# Patient Record
Sex: FEMALE | URBAN - NONMETROPOLITAN AREA
[De-identification: names, ages, dates, MRNs, and addresses within clinical notes are randomized per-mention and may not be internally consistent; named-entity substitution may affect disease eponyms.]

---

## 2024-01-23 ENCOUNTER — APPOINTMENT (RX ONLY)
Dept: URBAN - NONMETROPOLITAN AREA CLINIC 41 | Facility: CLINIC | Age: 34
Setting detail: DERMATOLOGY
End: 2024-01-23

## 2024-01-23 VITALS — WEIGHT: 190 LBS | HEIGHT: 62 IN

## 2024-01-23 DIAGNOSIS — D22 MELANOCYTIC NEVI: ICD-10-CM

## 2024-01-23 DIAGNOSIS — L30.9 DERMATITIS, UNSPECIFIED: ICD-10-CM

## 2024-01-23 DIAGNOSIS — L57.8 OTHER SKIN CHANGES DUE TO CHRONIC EXPOSURE TO NONIONIZING RADIATION: ICD-10-CM

## 2024-01-23 PROBLEM — D22.5 MELANOCYTIC NEVI OF TRUNK: Status: ACTIVE | Noted: 2024-01-23

## 2024-01-23 PROCEDURE — 11104 PUNCH BX SKIN SINGLE LESION: CPT

## 2024-01-23 PROCEDURE — 96372 THER/PROPH/DIAG INJ SC/IM: CPT | Mod: 59

## 2024-01-23 PROCEDURE — ? COUNSELING

## 2024-01-23 PROCEDURE — 11105 PUNCH BX SKIN EA SEP/ADDL: CPT

## 2024-01-23 PROCEDURE — 99203 OFFICE O/P NEW LOW 30 MIN: CPT | Mod: 25

## 2024-01-23 PROCEDURE — ? INTRAMUSCULAR KENALOG

## 2024-01-23 PROCEDURE — ? DIAGNOSIS COMMENT

## 2024-01-23 PROCEDURE — ? BIOPSY BY PUNCH METHOD

## 2024-01-23 ASSESSMENT — BSA RASH: BSA RASH: 50

## 2024-01-23 ASSESSMENT — LOCATION DETAILED DESCRIPTION DERM
LOCATION DETAILED: LEFT KNEE
LOCATION DETAILED: RIGHT KNEE
LOCATION DETAILED: RIGHT POSTERIOR SHOULDER
LOCATION DETAILED: LEFT BUTTOCK
LOCATION DETAILED: LEFT SUPERIOR UPPER BACK
LOCATION DETAILED: RIGHT ANTERIOR PROXIMAL THIGH
LOCATION DETAILED: LEFT ANTERIOR PROXIMAL THIGH
LOCATION DETAILED: LEFT DISTAL PRETIBIAL REGION
LOCATION DETAILED: LEFT DISTAL POSTERIOR THIGH
LOCATION DETAILED: STERNAL NOTCH
LOCATION DETAILED: LEFT POSTERIOR SHOULDER
LOCATION DETAILED: RIGHT DISTAL POSTERIOR THIGH
LOCATION DETAILED: RIGHT BUTTOCK
LOCATION DETAILED: RIGHT DISTAL PRETIBIAL REGION
LOCATION DETAILED: LEFT DISTAL CALF
LOCATION DETAILED: RIGHT POSTERIOR ANKLE
LOCATION DETAILED: RIGHT INFERIOR UPPER BACK

## 2024-01-23 ASSESSMENT — LOCATION SIMPLE DESCRIPTION DERM
LOCATION SIMPLE: LEFT CALF
LOCATION SIMPLE: RIGHT PRETIBIAL REGION
LOCATION SIMPLE: RIGHT THIGH
LOCATION SIMPLE: LEFT KNEE
LOCATION SIMPLE: LEFT PRETIBIAL REGION
LOCATION SIMPLE: RIGHT SHOULDER
LOCATION SIMPLE: RIGHT POSTERIOR THIGH
LOCATION SIMPLE: LEFT BUTTOCK
LOCATION SIMPLE: RIGHT BUTTOCK
LOCATION SIMPLE: LEFT THIGH
LOCATION SIMPLE: CHEST
LOCATION SIMPLE: RIGHT ANKLE
LOCATION SIMPLE: RIGHT KNEE
LOCATION SIMPLE: LEFT POSTERIOR THIGH
LOCATION SIMPLE: LEFT UPPER BACK
LOCATION SIMPLE: LEFT SHOULDER
LOCATION SIMPLE: RIGHT UPPER BACK

## 2024-01-23 ASSESSMENT — ITCH NUMERIC RATING SCALE: HOW SEVERE IS YOUR ITCHING?: 2

## 2024-01-23 ASSESSMENT — SEVERITY ASSESSMENT: SEVERITY: MODERATE TO SEVERE

## 2024-01-23 ASSESSMENT — LOCATION ZONE DERM
LOCATION ZONE: TRUNK
LOCATION ZONE: ARM
LOCATION ZONE: LEG

## 2024-01-23 ASSESSMENT — PAIN INTENSITY VAS: HOW INTENSE IS YOUR PAIN 0 BEING NO PAIN, 10 BEING THE MOST SEVERE PAIN POSSIBLE?: NO PAIN

## 2024-01-23 NOTE — PROCEDURE: INTRAMUSCULAR KENALOG
Detail Level: None
Consent: The risks of atrophy were reviewed with the patient.
Concentration (Mg/Ml) Of Additional Medication: 2.5
Concentration (Mg/Ml): 40.0
Total Volume (Ccs): 2
Add Option For Additional Mediation: No
Treatment Number (Optional): 1
Kenalog Preparation: kenalog

## 2024-01-23 NOTE — PROCEDURE: BIOPSY BY PUNCH METHOD
Detail Level: Detailed
Was A Bandage Applied: Yes
Punch Size In Mm: 5
Size Of Lesion In Cm (Optional): 0
Depth Of Punch Biopsy: dermis
Biopsy Type: H and E
Anesthesia Type: 2% lidocaine with epinephrine
Anesthesia Volume In Cc: 0.5
Hemostasis: None
Epidermal Sutures: 4-0 Surgipro
Number Of Epidermal Sutures (Optional): 4
Wound Care: Petrolatum
Dressing: bandage
Suture Removal: 14 days
Patient Will Remove Sutures At Home?: No
Lab: [id number]
Lab Facility: [id number]
Consent: Written consent was obtained and risks were reviewed including but not limited to scarring, infection, bleeding, scabbing, incomplete removal, nerve damage and allergy to anesthesia.
Post-Care Instructions: I reviewed with the patient in detail post-care instructions. Patient is to keep the biopsy site dry overnight, and then apply bacitracin twice daily until healed. Patient may apply hydrogen peroxide soaks to remove any crusting.
Home Suture Removal Text: Patient was provided a home suture removal kit and will remove their sutures at home.  If they have any questions or difficulties they will call the office.
Notification Instructions: Patient will be notified of biopsy results. However, patient instructed to call the office if not contacted within 2 weeks.
Billing Type: Third-Party Bill
Information: Selecting Yes will display possible errors in your note based on the variables you have selected. This validation is only offered as a suggestion for you. PLEASE NOTE THAT THE VALIDATION TEXT WILL BE REMOVED WHEN YOU FINALIZE YOUR NOTE. IF YOU WANT TO FAX A PRELIMINARY NOTE YOU WILL NEED TO TOGGLE THIS TO 'NO' IF YOU DO NOT WANT IT IN YOUR FAXED NOTE.
Biopsy Type: DIF
Anesthesia Type: 1% lidocaine with epinephrine

## 2024-01-23 NOTE — PROCEDURE: DIAGNOSIS COMMENT
Render Risk Assessment In Note?: no
Comment: Patient is a healthy appearing female. She is presenting with concerns of persistent, acute  lower extremity dermatitis that started on January 4th apparently unprovoked. She states that it started suddenly and she suspected possible flea bites to the lower extremities and then quickly progressed proximately. Initially the skin rash had mild to moderate itching with burning and no concerns of constituent symptoms including fever, nausea, vomiting or diarrhea. She says that no one else at home experiencing similar symptoms. She suspected possible flea bite given that she has dogs that venture outside and then come to bed with her and her .\\nShe went to the ED a few days after and she was given Cephalexin and Prednisone P.O. She took the prescriptions for a few days and then she continued to get worse. She returned to the ED and was given more prednisone and advised to stop Cephalexin. Afterwards she felt a little bit better and in fact she started to clear up. However, after finishing the Prednisone she noticed that the skin rash came back and was getting worse. She returned to the ED and was given Prednisolone and also Naproxen which has shown some good improvement. She’s still has four days left on the prednisone and states that she has not seen complete clearance. She continues to deny other constituent symptoms including fever and chills and also states that she has not traveled outside the country. She has no history of allergies except to amoxicillin which she has not come into contact. She also says that she has not changed anything at home  including food, soaps and beauty products. Her  has no concerns.
Detail Level: Simple

## 2024-02-09 ENCOUNTER — APPOINTMENT (RX ONLY)
Dept: URBAN - NONMETROPOLITAN AREA CLINIC 41 | Facility: CLINIC | Age: 34
Setting detail: DERMATOLOGY
End: 2024-02-09

## 2024-02-09 VITALS — HEIGHT: 62 IN | WEIGHT: 190 LBS

## 2024-02-09 DIAGNOSIS — Z79.899 OTHER LONG TERM (CURRENT) DRUG THERAPY: ICD-10-CM

## 2024-02-09 DIAGNOSIS — L57.8 OTHER SKIN CHANGES DUE TO CHRONIC EXPOSURE TO NONIONIZING RADIATION: ICD-10-CM

## 2024-02-09 DIAGNOSIS — M31.0 HYPERSENSITIVITY ANGIITIS: ICD-10-CM

## 2024-02-09 DIAGNOSIS — D22 MELANOCYTIC NEVI: ICD-10-CM

## 2024-02-09 PROBLEM — D22.5 MELANOCYTIC NEVI OF TRUNK: Status: ACTIVE | Noted: 2024-02-09

## 2024-02-09 PROCEDURE — ? COUNSELING

## 2024-02-09 PROCEDURE — ? DIAGNOSIS COMMENT

## 2024-02-09 PROCEDURE — 99214 OFFICE O/P EST MOD 30 MIN: CPT

## 2024-02-09 PROCEDURE — ? ORDER TESTS

## 2024-02-09 PROCEDURE — ? PRESCRIPTION

## 2024-02-09 RX ORDER — PREDNISONE 20 MG/1
TABLET ORAL
Qty: 46 | Refills: 0 | Status: ERX

## 2024-02-09 RX ORDER — CLOBETASOL PROPIONATE 0.5 MG/G
CREAM TOPICAL BID
Qty: 60 | Refills: 3 | Status: ERX | COMMUNITY
Start: 2024-02-09

## 2024-02-09 RX ORDER — DAPSONE 25 MG/1
TABLET ORAL
Qty: 30 | Refills: 0 | Status: ERX | COMMUNITY
Start: 2024-02-09

## 2024-02-09 RX ADMIN — CLOBETASOL PROPIONATE: 0.5 CREAM TOPICAL at 00:00

## 2024-02-09 RX ADMIN — DAPSONE: 25 TABLET ORAL at 00:00

## 2024-02-09 ASSESSMENT — LOCATION SIMPLE DESCRIPTION DERM
LOCATION SIMPLE: LEFT PRETIBIAL REGION
LOCATION SIMPLE: RIGHT SHOULDER
LOCATION SIMPLE: LEFT UPPER BACK
LOCATION SIMPLE: LEFT SHOULDER
LOCATION SIMPLE: RIGHT UPPER BACK
LOCATION SIMPLE: CHEST
LOCATION SIMPLE: LEFT ANKLE
LOCATION SIMPLE: RIGHT PRETIBIAL REGION
LOCATION SIMPLE: RIGHT ANKLE

## 2024-02-09 ASSESSMENT — LOCATION DETAILED DESCRIPTION DERM
LOCATION DETAILED: LEFT POSTERIOR SHOULDER
LOCATION DETAILED: LEFT POSTERIOR ANKLE
LOCATION DETAILED: RIGHT INFERIOR UPPER BACK
LOCATION DETAILED: LEFT SUPERIOR UPPER BACK
LOCATION DETAILED: RIGHT DISTAL PRETIBIAL REGION
LOCATION DETAILED: RIGHT POSTERIOR SHOULDER
LOCATION DETAILED: LEFT DISTAL PRETIBIAL REGION
LOCATION DETAILED: STERNAL NOTCH
LOCATION DETAILED: RIGHT POSTERIOR ANKLE

## 2024-02-09 ASSESSMENT — BSA RASH: BSA RASH: 15

## 2024-02-09 ASSESSMENT — LOCATION ZONE DERM
LOCATION ZONE: ARM
LOCATION ZONE: TRUNK
LOCATION ZONE: LEG

## 2024-02-09 ASSESSMENT — SEVERITY ASSESSMENT: SEVERITY: MILD TO MODERATE

## 2024-02-09 ASSESSMENT — PAIN INTENSITY VAS: HOW INTENSE IS YOUR PAIN 0 BEING NO PAIN, 10 BEING THE MOST SEVERE PAIN POSSIBLE?: NO PAIN

## 2024-02-09 NOTE — PROCEDURE: DIAGNOSIS COMMENT
Render Risk Assessment In Note?: no
Detail Level: Simple
Comment: Patient  states she went to the ER about 4 to 5 days ago with acute exacerbation of the dermatitis, especially to the lower extremities. She was given prednisone 40 mg every day for five days. She has noticed some improvement but still symptomatic to the distal LE. She denies hematuria, fever, chills, nausea, vomiting, blood in stool or other constituent symptoms. She has not done the labs ordered 2 weeks ago stating that she never got the calls from the clinic due to poor reception at her house. She was sent home with lab slip to be done this afternoon at [facility], with clear instructions to bring the results on Friday at follow up. \\nPlan:\\n- Extensive labs including CBCs\\n- Started on Prednisone 60,40,20, 10/ week taper.\\n- Dapsone 50 mg daily - close monitor CBC\\n- Topical steroids \\n- Follow up on Friday with lab results \\n- ER precautions given

## 2024-02-16 ENCOUNTER — APPOINTMENT (RX ONLY)
Dept: URBAN - NONMETROPOLITAN AREA CLINIC 41 | Facility: CLINIC | Age: 34
Setting detail: DERMATOLOGY
End: 2024-02-16

## 2024-02-16 DIAGNOSIS — J02.0 STREPTOCOCCAL PHARYNGITIS: ICD-10-CM

## 2024-02-16 DIAGNOSIS — M31.0 HYPERSENSITIVITY ANGIITIS: ICD-10-CM

## 2024-02-16 DIAGNOSIS — L57.8 OTHER SKIN CHANGES DUE TO CHRONIC EXPOSURE TO NONIONIZING RADIATION: ICD-10-CM

## 2024-02-16 DIAGNOSIS — Z79.899 OTHER LONG TERM (CURRENT) DRUG THERAPY: ICD-10-CM

## 2024-02-16 PROCEDURE — ? DIAGNOSIS COMMENT

## 2024-02-16 PROCEDURE — 36415 COLL VENOUS BLD VENIPUNCTURE: CPT

## 2024-02-16 PROCEDURE — ? ORDER TESTS

## 2024-02-16 PROCEDURE — 99214 OFFICE O/P EST MOD 30 MIN: CPT

## 2024-02-16 PROCEDURE — ? PRESCRIPTION

## 2024-02-16 PROCEDURE — ? VENIPUNCTURE

## 2024-02-16 PROCEDURE — ? COUNSELING

## 2024-02-16 RX ORDER — AZITHROMYCIN MONOHYDRATE 250 MG/1
TABLET, FILM COATED ORAL
Qty: 6 | Refills: 0 | Status: ERX | COMMUNITY
Start: 2024-02-16

## 2024-02-16 RX ORDER — DAPSONE 100 MG/1
TABLET ORAL
Qty: 30 | Refills: 0 | Status: ERX | COMMUNITY
Start: 2024-02-16

## 2024-02-16 RX ADMIN — DAPSONE: 100 TABLET ORAL at 00:00

## 2024-02-16 RX ADMIN — AZITHROMYCIN MONOHYDRATE: 250 TABLET, FILM COATED ORAL at 00:00

## 2024-02-16 ASSESSMENT — LOCATION DETAILED DESCRIPTION DERM
LOCATION DETAILED: RIGHT DISTAL PRETIBIAL REGION
LOCATION DETAILED: RIGHT POSTERIOR SHOULDER
LOCATION DETAILED: LEFT POSTERIOR ANKLE
LOCATION DETAILED: RIGHT POSTERIOR ANKLE
LOCATION DETAILED: LEFT DISTAL PRETIBIAL REGION
LOCATION DETAILED: RIGHT ANTECUBITAL SKIN
LOCATION DETAILED: LEFT POSTERIOR SHOULDER

## 2024-02-16 ASSESSMENT — LOCATION SIMPLE DESCRIPTION DERM
LOCATION SIMPLE: LEFT SHOULDER
LOCATION SIMPLE: RIGHT SHOULDER
LOCATION SIMPLE: LEFT ANKLE
LOCATION SIMPLE: RIGHT ANKLE
LOCATION SIMPLE: LEFT PRETIBIAL REGION
LOCATION SIMPLE: RIGHT PRETIBIAL REGION
LOCATION SIMPLE: RIGHT UPPER ARM

## 2024-02-16 ASSESSMENT — LOCATION ZONE DERM
LOCATION ZONE: ARM
LOCATION ZONE: LEG

## 2024-02-16 NOTE — PROCEDURE: DIAGNOSIS COMMENT
Comment: Pt has elevated ASO which indicates recent streptococcal infection. Treated prophylactically with Azithromycin. She is allergic to Penicillins.
Detail Level: Simple
Render Risk Assessment In Note?: no
Comment: Pt doing well on Dapsone 50 mg daily. Recent labs revealed abnormal HEP Bsurface antigen titers with slightly elevated white count 11K ( likely due to steroids ) UA is unremarkable without hematuria. CBC unremarkable. Rest of labs were within normal limits. \\nPlan:\\nTitrate Dapsone to 100 mg daily \\nZ-pack dose for possible strep\\nContinue topical steroids\\nGood wound care\\nReturn in 2 weeks for repeat CBC

## 2024-02-16 NOTE — PROCEDURE: VENIPUNCTURE
Bill [id number] For Specimen Handling/Conveyance To Laboratory?: no
Venipuncture Paragraph: An alcohol pad was applied to the venipuncture site. Venipuncture was performed using a butterfly needle. Pressure and a bandaid was applied to the site. No complications were noted.
Number Of Tubes Drawn: 1
Detail Level: None

## 2024-02-16 NOTE — PROCEDURE: ORDER TESTS
Billing Type: Third-Party Bill
Bill For Surgical Tray: no
Performing Laboratory: [id number]
Expected Date Of Service: 02/16/2024